# Patient Record
Sex: MALE | Race: WHITE
[De-identification: names, ages, dates, MRNs, and addresses within clinical notes are randomized per-mention and may not be internally consistent; named-entity substitution may affect disease eponyms.]

---

## 2018-04-18 ENCOUNTER — HOSPITAL ENCOUNTER (OUTPATIENT)
Dept: HOSPITAL 82 - ENDO | Age: 63
Discharge: HOME | End: 2018-04-18
Attending: SURGERY
Payer: MEDICARE

## 2018-04-18 VITALS — HEIGHT: 66 IN | WEIGHT: 206 LBS | BODY MASS INDEX: 33.11 KG/M2

## 2018-04-18 DIAGNOSIS — Z53.29: ICD-10-CM

## 2018-04-18 DIAGNOSIS — K44.9: Primary | ICD-10-CM

## 2018-05-09 ENCOUNTER — HOSPITAL ENCOUNTER (OUTPATIENT)
Dept: HOSPITAL 82 - ORM | Age: 63
Discharge: HOME | End: 2018-05-09
Attending: SURGERY
Payer: MEDICARE

## 2018-05-09 VITALS — DIASTOLIC BLOOD PRESSURE: 82 MMHG | SYSTOLIC BLOOD PRESSURE: 127 MMHG

## 2018-05-09 VITALS — WEIGHT: 210 LBS | HEIGHT: 67 IN | BODY MASS INDEX: 32.96 KG/M2

## 2018-05-09 DIAGNOSIS — K21.9: ICD-10-CM

## 2018-05-09 DIAGNOSIS — M19.90: ICD-10-CM

## 2018-05-09 DIAGNOSIS — I48.91: ICD-10-CM

## 2018-05-09 DIAGNOSIS — I11.0: ICD-10-CM

## 2018-05-09 DIAGNOSIS — I25.10: ICD-10-CM

## 2018-05-09 DIAGNOSIS — Z91.19: ICD-10-CM

## 2018-05-09 DIAGNOSIS — I73.9: ICD-10-CM

## 2018-05-09 DIAGNOSIS — K44.9: Primary | ICD-10-CM

## 2018-05-09 DIAGNOSIS — R63.4: ICD-10-CM

## 2018-05-09 DIAGNOSIS — R68.81: ICD-10-CM

## 2018-05-09 DIAGNOSIS — Z79.01: ICD-10-CM

## 2018-05-09 DIAGNOSIS — K29.70: ICD-10-CM

## 2018-05-09 DIAGNOSIS — K22.70: ICD-10-CM

## 2018-05-09 DIAGNOSIS — R14.2: ICD-10-CM

## 2018-05-09 DIAGNOSIS — Z86.711: ICD-10-CM

## 2018-05-09 DIAGNOSIS — I50.9: ICD-10-CM

## 2018-05-09 PROCEDURE — 0DJ08ZZ INSPECTION OF UPPER INTESTINAL TRACT, VIA NATURAL OR ARTIFICIAL OPENING ENDOSCOPIC: ICD-10-PCS | Performed by: SURGERY

## 2018-05-09 NOTE — NUR
PT ARRIVED TO FLOOR VIA STRETCHER. SCOOTED SELF OVER OT BED. DENIES PAIN.
REPORTING OF CONCERNS ENCOURAGED. COFFEE REQUESTED AND PROVIDED TO PT. PT
STATES "I JUST NEED TO WATCH TV, AND ILL BE FINE." CALL LIGHT REVIEWED AND IN
REACH.

## 2019-07-06 ENCOUNTER — HOSPITAL ENCOUNTER (EMERGENCY)
Dept: HOSPITAL 82 - ED | Age: 64
Discharge: HOME | End: 2019-07-06
Payer: MEDICARE

## 2019-07-06 VITALS — WEIGHT: 220.46 LBS | BODY MASS INDEX: 34.6 KG/M2 | HEIGHT: 67 IN

## 2019-07-06 VITALS — DIASTOLIC BLOOD PRESSURE: 94 MMHG | SYSTOLIC BLOOD PRESSURE: 153 MMHG

## 2019-07-06 DIAGNOSIS — Z95.0: ICD-10-CM

## 2019-07-06 DIAGNOSIS — Z86.718: ICD-10-CM

## 2019-07-06 DIAGNOSIS — M10.032: Primary | ICD-10-CM

## 2019-07-06 DIAGNOSIS — I25.10: ICD-10-CM

## 2019-07-06 DIAGNOSIS — I10: ICD-10-CM

## 2019-07-06 DIAGNOSIS — Z95.5: ICD-10-CM
